# Patient Record
Sex: FEMALE | Race: BLACK OR AFRICAN AMERICAN | NOT HISPANIC OR LATINO | Employment: FULL TIME | URBAN - NONMETROPOLITAN AREA
[De-identification: names, ages, dates, MRNs, and addresses within clinical notes are randomized per-mention and may not be internally consistent; named-entity substitution may affect disease eponyms.]

---

## 2021-05-03 ENCOUNTER — TRANSCRIBE ORDERS (OUTPATIENT)
Dept: PODIATRY | Facility: CLINIC | Age: 36
End: 2021-05-03

## 2021-05-03 DIAGNOSIS — L60.8 OTHER NAIL DISORDERS: Primary | ICD-10-CM

## 2021-05-25 ENCOUNTER — OFFICE VISIT (OUTPATIENT)
Dept: PODIATRY | Facility: CLINIC | Age: 36
End: 2021-05-25

## 2021-05-25 VITALS
BODY MASS INDEX: 30.22 KG/M2 | OXYGEN SATURATION: 99 % | TEMPERATURE: 98.2 F | HEIGHT: 64 IN | WEIGHT: 177 LBS | HEART RATE: 89 BPM

## 2021-05-25 DIAGNOSIS — B35.1 ONYCHOMYCOSIS: Primary | ICD-10-CM

## 2021-05-25 PROCEDURE — 99203 OFFICE O/P NEW LOW 30 MIN: CPT | Performed by: PODIATRIST

## 2021-05-25 RX ORDER — LEVONORGESTREL AND ETHINYL ESTRADIOL 0.1-0.02MG
KIT ORAL
COMMUNITY
Start: 2021-05-03

## 2021-05-25 RX ORDER — PNV NO.121/IRON/FOLIC ACID 28MG-0.8MG
TABLET ORAL
COMMUNITY

## 2021-05-25 NOTE — PROGRESS NOTES
Dana Zamorano  1985  35 y.o. female     Patient came to clinic for concern of possible nail fungal on left hallux.     05/25/2021    Chief Complaint   Patient presents with   • Left Foot - Nail Problem       History of Present Illness    Dana Zamorano is a 35 y.o.female who presents to clinic today with chief complaint of discolored and thickened left great toenail.  Issue started several months ago.  She believes she obtained a fungus from a nail salon.  She has tried prescription oral antifungal medicine which upset her stomach so she discontinued it.  She has been using topical prescription antifungal medication for the last couple of months which is only helping slightly.  She denies any injuries or trauma to the toe.  It is not currently painful.  She has no other complaints.      History reviewed. No pertinent past medical history.      History reviewed. No pertinent surgical history.      History reviewed. No pertinent family history.    No Known Allergies    Social History     Socioeconomic History   • Marital status:      Spouse name: Not on file   • Number of children: Not on file   • Years of education: Not on file   • Highest education level: Not on file         Current Outpatient Medications   Medication Sig Dispense Refill   • Prenatal Vit-Fe Fumarate-FA ( Prenatal Multivitamins) 28-0.8 MG tablet Prenatal Multivitamins   1 tab daily     • Vienva 0.1-20 MG-MCG per tablet        No current facility-administered medications for this visit.       Review of Systems   Constitutional: Negative.    HENT: Negative.    Eyes: Negative.    Respiratory: Negative.    Cardiovascular: Negative.    Gastrointestinal: Negative.    Musculoskeletal: Negative.    Skin: Negative.    Allergic/Immunologic: Negative.    Neurological: Negative.    Hematological: Negative.    Psychiatric/Behavioral: Negative.          OBJECTIVE    Pulse 89   Temp 98.2 °F (36.8 °C)   Ht 162.6 cm  "(64\")   Wt 80.3 kg (177 lb)   SpO2 99%   BMI 30.38 kg/m²     Physical Exam  Vitals reviewed.   Constitutional:       General: She is not in acute distress.     Appearance: She is well-developed.   HENT:      Head: Normocephalic and atraumatic.      Nose: Nose normal.   Eyes:      Conjunctiva/sclera: Conjunctivae normal.      Pupils: Pupils are equal, round, and reactive to light.   Pulmonary:      Effort: Pulmonary effort is normal. No respiratory distress.      Breath sounds: No wheezing.   Musculoskeletal:         General: No deformity. Normal range of motion.   Skin:     General: Skin is warm and dry.      Capillary Refill: Capillary refill takes less than 2 seconds.   Neurological:      Mental Status: She is alert and oriented to person, place, and time.   Psychiatric:         Behavior: Behavior normal.         Thought Content: Thought content normal.           Lower Extremity Exam:     Cardiovascular:    DP/PT pulses palpable b/l    CFT brisk  to all digits b/l  No erythema or edema noted b/l  Musculoskeletal:  Muscle strength is 5/5 for all muscle groups tested b/l  ROM of the ankle joint is  WNL b/l  No pain on palpation noted  b/l  Dermatological:   Webspaces 1-4 b/l are clean, dry and intact.   No subcutaneous nodules or masses noted  b/l  Left hallux nail is thickened and discolored with subungual debris.  Neurological:   Sensation intact to light touch b/l       Foot/Ankle Exam        Procedures        ASSESSMENT AND PLAN    Diagnoses and all orders for this visit:    1. Onychomycosis (Primary)        - Comprehensive foot and ankle exam performed.   - Diagnosis and treatment of fungal toenails was discussed in detail.  Additional treatment options discussed included nail removal both temporary and permanent.   -Patient wishes to continue topical antifungal for now.  Advised her that topical antifungal can take upwards of 1 year to work.  Patient verbalized understanding.  She will return for nail " removal if toenail does not continue to improve.  - All questions were answered to the patients satisfaction.  - RTC as needed            This document has been electronically signed by Tima Flores DPM on May 25, 2021 09:40 CDT     5/25/2021  09:40 CDT